# Patient Record
Sex: MALE | Race: WHITE
[De-identification: names, ages, dates, MRNs, and addresses within clinical notes are randomized per-mention and may not be internally consistent; named-entity substitution may affect disease eponyms.]

---

## 2020-10-23 ENCOUNTER — HOSPITAL ENCOUNTER (OUTPATIENT)
Age: 65
End: 2020-10-23
Payer: MEDICARE

## 2020-10-23 DIAGNOSIS — M25.561: Primary | ICD-10-CM

## 2020-10-23 PROCEDURE — 73562 X-RAY EXAM OF KNEE 3: CPT

## 2020-10-23 NOTE — DI.RAD.S_ITS
PROCEDURE:  XR KNEE RT 3V  
   
INDICATIONS:  Chronic right knee pain  
   
TECHNIQUE:  4 views of the knee were acquired.    
   
COMPARISON:  None.  
   
FINDINGS:    
   
Bones:  No fractures or dislocations.  No suspicious bony lesions.    
   
Soft tissues:  No joint effusion.  No suspicious soft tissue calcifications.    
   
IMPRESSION:  No acute abnormality of the right knee.  
   
   
Dictated by: Klaus Schulz M.D. on 10/23/2020 at 17:04       
Approved by: Klaus Schulz M.D. on 10/23/2020 at 17:06

## 2020-10-24 ENCOUNTER — HOSPITAL ENCOUNTER (OUTPATIENT)
Age: 65
End: 2020-10-24
Payer: MEDICARE

## 2020-10-24 DIAGNOSIS — N52.9: ICD-10-CM

## 2020-10-24 DIAGNOSIS — K22.70: ICD-10-CM

## 2020-10-24 DIAGNOSIS — E78.5: Primary | ICD-10-CM

## 2020-10-24 DIAGNOSIS — R73.9: ICD-10-CM

## 2020-10-24 DIAGNOSIS — Z12.5: ICD-10-CM

## 2020-10-24 LAB
ALBUMIN SERPL-MCNC: 4.1 G/DL (ref 3.5–5)
ALBUMIN/GLOB SERPL: 1.5 {RATIO} (ref 1–2.8)
ALP SERPL-CCNC: 71 U/L (ref 38–126)
ALT SERPL-CCNC: 11 IU/L (ref ?–50)
BUN SERPL-MCNC: 14 MG/DL (ref 9–20)
CALCIUM SERPL-MCNC: 9.1 MG/DL (ref 8.4–10.2)
CHLORIDE SERPL-SCNC: 105 MMOL/L (ref 98–107)
CHOLEST SERPL-MCNC: 142 MG/DL (ref 140–199)
CO2 SERPL-SCNC: 31 MMOL/L (ref 22–32)
ESTIMATED GLOMERULAR FILT RATE: > 60 ML/MIN (ref 60–?)
GLOBULIN SER CALC-MCNC: 2.8 G/DL (ref 1.7–4.1)
GLUCOSE SERPL-MCNC: 97 MG/DL (ref 80–110)
HBA1C MFR BLD: 5.6 % (ref 4–6)
HDLC SERPL-MCNC: 63 MG/DL (ref 40–60)
HEMOLYSIS: < 15 (ref 0–50)
POTASSIUM SERPL-SCNC: 4 MMOL/L (ref 3.4–5.1)
PROT SERPL-MCNC: 6.9 G/DL (ref 6.3–8.2)
SODIUM SERPL-SCNC: 139 MMOL/L (ref 137–145)
TRIGL SERPL-MCNC: 119 MG/DL (ref 35–150)

## 2020-10-24 PROCEDURE — 80061 LIPID PANEL: CPT

## 2020-10-24 PROCEDURE — 80053 COMPREHEN METABOLIC PANEL: CPT

## 2020-10-24 PROCEDURE — 83036 HEMOGLOBIN GLYCOSYLATED A1C: CPT

## 2020-10-24 PROCEDURE — 36415 COLL VENOUS BLD VENIPUNCTURE: CPT

## 2020-10-28 ENCOUNTER — HOSPITAL ENCOUNTER (OUTPATIENT)
Age: 65
End: 2020-10-28
Payer: MEDICARE

## 2020-10-28 DIAGNOSIS — S83.411A: ICD-10-CM

## 2020-10-28 DIAGNOSIS — M25.461: ICD-10-CM

## 2020-10-28 DIAGNOSIS — M25.561: Primary | ICD-10-CM

## 2020-10-28 DIAGNOSIS — S83.231A: ICD-10-CM

## 2020-10-28 PROCEDURE — 73721 MRI JNT OF LWR EXTRE W/O DYE: CPT

## 2020-10-28 NOTE — DI.MRI.S_ITS
PROCEDURE:  MR KNEE RT WO CON  
   
INDICATIONS:  Evaluate chronic right knee pain  
   
TECHNIQUE:    
Noncontrast sagittal PD fast spin echo and T2 fast spin echo with fat saturation,   
sagittal 3-D FLASH with fat saturation; coronal T1 spin echo and PD fast spin echo with   
fat saturation, and axial PD fast spin echo with fat saturation through the knee.    
   
COMPARISON:  [facility], CR, XR KNEE RT 3V, 10/23/2020, 16:41.  
   
FINDINGS:    
Image quality:  Excellent.    
   
Menisci:  There is complex tear of the posterior horn of the medial meniscus involving   
the inferior articular surface.  A horizontal tear is noted in the body of the medial   
meniscus without involvement of the articular surface.  The lateral meniscus demonstrates   
 normal morphology and internal signal.  The meniscal root ligaments appear intact.    
   
Cruciate ligaments:  There is mucoid degeneration of the anterior cruciate ligament.  The   
posterior cruciate ligament appears intact.    
   
Medial structures:  There is grade 1 sprain of the medial collateral ligament.  The   
semimembranosus tendon insertions and meniscocapsular junction appear intact.  Visualized   
portions of the pes anserinus tendons appear normal.  No abnormal bursal fluid.    
   
Lateral structures:  The lateral collateral ligament, long and short heads of the biceps   
femoris tendon appear intact.  The popliteus tendon appears normal.  Iliotibial band   
appears normal.    
   
Anterior structures:  The quadriceps and patellar tendons appear intact.  Patellar   
alignment is normal.  No femoral trochlear dysplasia or ventral trochlear prominence.  No   
edema in the infrapatellar fat pad.    
   
Bones and cartilage:  No bone marrow contusions or fractures.  The cartilage of the   
medial and lateral femorotibial compartments, as well as the patellofemoral compartment,   
appears normal in thickness.    
   
Joint space:  There is small-to-moderate knee joint effusion.  No Baker's cyst.  Normal   
appearing synovial plicae are incidentally noted.    
   
IMPRESSION:    
   
1. Complex tear of the posterior horn the medial meniscus involving the inferior   
articular surface.  There is also horizontal tear of the body of the medial meniscus   
without involving the articular surface.  
   
2. Mucoid degeneration of the anterior cruciate ligament.  
   
3. Grade 1 sprain of the medial collateral ligament.  
   
4. Small-to-moderate knee joint effusion.    
   
   
   
Dictated by: Rob Sharpe M.D. on 10/29/2020 at 8:03       
Approved by: Rob Sharpe M.D. on 10/29/2020 at 12:36

## 2020-12-15 ENCOUNTER — HOSPITAL ENCOUNTER (OUTPATIENT)
Age: 65
End: 2020-12-15
Payer: MEDICARE

## 2020-12-15 DIAGNOSIS — N28.1: Primary | ICD-10-CM

## 2020-12-15 PROCEDURE — 76770 US EXAM ABDO BACK WALL COMP: CPT

## 2020-12-15 NOTE — DI.US.S_ITS
PROCEDURE:  US RENAL COMPLETE  
   
INDICATIONS:  RIGHT RENAL CYSTS ON CATSCAN  
   
TECHNIQUE:    
Real-time scanning was performed of the kidneys and bladder, with image documentation.    
   
COMPARISON:  [facility], RG, CT  LUNG CANCER SCREENING, 11/27/2020, 15:33.  
   
FINDINGS:    
   
Kidneys:  .  Right kidney measures 16.6 cm long; left kidney measures 11.3 cm long.    
Right renal cortical thickness is not visible sonographically due to advanced cystic   
changes; left renal cortical thickness is 1.9 cm.  Renal cortical echotexture is normal.    
No hydronephrosis or nephrolithiasis.  No suspicious solid mass lesions.  There are   
multiple large right renal cysts, the largest of which measures 6.1 x 5.1 x 6.0 cm, 6.3 x   
4.5 x 4.6 cm, 5.4 x 3.4 x 5.3 cm.  Small left renal cysts are seen in the lower pole   
measuring 0.8 x 0.5 x 0.7 cm, and in the upper pole measuring 1.3 x 1.0 x 1.3 cm.   
   
Bladder:  Bladder unremarkable.  Both ureteral jets visualized.  
   
Miscellaneous:  No free pelvic fluid.    
   
IMPRESSION:  Polycystic right kidney.  
   
Few small left renal cysts.  
   
   
   
   
Dictated by: Lalito Goff M.D. on 12/15/2020 at 13:20       
Approved by: Lalito Goff M.D. on 12/15/2020 at 13:23

## 2022-02-10 ENCOUNTER — HOSPITAL ENCOUNTER (OUTPATIENT)
Age: 67
End: 2022-02-10
Payer: MEDICARE

## 2022-02-10 DIAGNOSIS — M25.511: Primary | ICD-10-CM

## 2022-02-10 DIAGNOSIS — G89.29: ICD-10-CM

## 2022-02-10 PROCEDURE — 73030 X-RAY EXAM OF SHOULDER: CPT

## 2022-02-12 ENCOUNTER — HOSPITAL ENCOUNTER (OUTPATIENT)
Age: 67
End: 2022-02-12
Payer: MEDICARE

## 2022-02-12 DIAGNOSIS — E78.5: Primary | ICD-10-CM

## 2022-02-12 DIAGNOSIS — Z12.5: ICD-10-CM

## 2022-02-12 DIAGNOSIS — R73.9: ICD-10-CM

## 2022-02-12 DIAGNOSIS — N52.9: ICD-10-CM

## 2022-02-12 DIAGNOSIS — K22.70: ICD-10-CM

## 2022-02-12 LAB
ADD MANUAL DIFF / SLIDE REVIEW: NO
ALBUMIN SERPL-MCNC: 4.1 G/DL (ref 3.5–5)
ALBUMIN/GLOB SERPL: 1.4 {RATIO} (ref 1–2.8)
ALP SERPL-CCNC: 112 U/L (ref 38–126)
ALT SERPL-CCNC: 11 IU/L (ref ?–50)
BUN SERPL-MCNC: 15 MG/DL (ref 9–20)
CALCIUM SERPL-MCNC: 9.4 MG/DL (ref 8.4–10.2)
CHLORIDE SERPL-SCNC: 106 MMOL/L (ref 98–107)
CHOLEST SERPL-MCNC: 132 MG/DL (ref 140–199)
CO2 SERPL-SCNC: 29 MMOL/L (ref 22–32)
ESTIMATED GLOMERULAR FILT RATE: > 60 ML/MIN (ref 60–?)
GLOBULIN SER CALC-MCNC: 2.9 G/DL (ref 1.7–4.1)
GLUCOSE SERPL-MCNC: 102 MG/DL (ref 80–110)
HDLC SERPL-MCNC: 49 MG/DL (ref 40–60)
HEMATOCRIT: 41.5 % (ref 41–53)
HEMOGLOBIN: 14.1 G/DL (ref 13.5–17.5)
HEMOLYSIS: < 15 (ref 0–50)
LYMPHOCYTES # SPEC AUTO: 1800 /UL (ref 1100–4500)
MCV RBC: 93.7 FL (ref 80–100)
MEAN CORPUSCULAR HEMOGLOBIN: 31.8 PG (ref 26–34)
MEAN CORPUSCULAR HGB CONC: 34 % (ref 30–36)
PLATELET COUNT: 301 X10^3/UL (ref 150–400)
POTASSIUM SERPL-SCNC: 4 MMOL/L (ref 3.4–5.1)
PROT SERPL-MCNC: 7 G/DL (ref 6.3–8.2)
SODIUM SERPL-SCNC: 139 MMOL/L (ref 137–145)
TRIGL SERPL-MCNC: 78 MG/DL (ref 35–150)

## 2022-02-12 PROCEDURE — 80053 COMPREHEN METABOLIC PANEL: CPT

## 2022-02-12 PROCEDURE — 85025 COMPLETE CBC W/AUTO DIFF WBC: CPT

## 2022-02-12 PROCEDURE — 36415 COLL VENOUS BLD VENIPUNCTURE: CPT

## 2022-02-12 PROCEDURE — 80061 LIPID PANEL: CPT

## 2022-04-27 ENCOUNTER — HOSPITAL ENCOUNTER (OUTPATIENT)
Age: 67
End: 2022-04-27
Payer: MEDICARE

## 2022-04-27 DIAGNOSIS — Z01.812: Primary | ICD-10-CM

## 2022-04-27 DIAGNOSIS — Z20.822: ICD-10-CM

## 2022-04-27 PROCEDURE — 87635 SARS-COV-2 COVID-19 AMP PRB: CPT

## 2022-04-28 ENCOUNTER — HOSPITAL ENCOUNTER (OUTPATIENT)
Age: 67
End: 2022-04-28
Payer: MEDICARE

## 2022-04-28 ENCOUNTER — HOSPITAL ENCOUNTER (OUTPATIENT)
Age: 67
Discharge: HOME | End: 2022-04-28
Payer: MEDICARE

## 2022-04-28 VITALS
TEMPERATURE: 97.4 F | DIASTOLIC BLOOD PRESSURE: 79 MMHG | HEART RATE: 61 BPM | OXYGEN SATURATION: 94 % | SYSTOLIC BLOOD PRESSURE: 126 MMHG | RESPIRATION RATE: 17 BRPM

## 2022-04-28 VITALS
RESPIRATION RATE: 15 BRPM | HEART RATE: 62 BPM | SYSTOLIC BLOOD PRESSURE: 125 MMHG | OXYGEN SATURATION: 95 % | DIASTOLIC BLOOD PRESSURE: 82 MMHG

## 2022-04-28 VITALS — BODY MASS INDEX: 28 KG/M2

## 2022-04-28 VITALS
HEART RATE: 59 BPM | OXYGEN SATURATION: 94 % | RESPIRATION RATE: 17 BRPM | SYSTOLIC BLOOD PRESSURE: 117 MMHG | DIASTOLIC BLOOD PRESSURE: 82 MMHG

## 2022-04-28 VITALS
RESPIRATION RATE: 18 BRPM | HEART RATE: 76 BPM | DIASTOLIC BLOOD PRESSURE: 77 MMHG | OXYGEN SATURATION: 93 % | SYSTOLIC BLOOD PRESSURE: 122 MMHG

## 2022-04-28 VITALS
RESPIRATION RATE: 14 BRPM | DIASTOLIC BLOOD PRESSURE: 87 MMHG | HEART RATE: 67 BPM | OXYGEN SATURATION: 96 % | TEMPERATURE: 98 F | SYSTOLIC BLOOD PRESSURE: 133 MMHG

## 2022-04-28 VITALS
OXYGEN SATURATION: 94 % | SYSTOLIC BLOOD PRESSURE: 112 MMHG | DIASTOLIC BLOOD PRESSURE: 84 MMHG | HEART RATE: 62 BPM | RESPIRATION RATE: 18 BRPM

## 2022-04-28 DIAGNOSIS — Z12.11: Primary | ICD-10-CM

## 2022-04-28 DIAGNOSIS — K64.8: ICD-10-CM

## 2022-04-28 DIAGNOSIS — Z86.010: ICD-10-CM

## 2022-04-28 DIAGNOSIS — D17.1: Primary | ICD-10-CM

## 2022-04-28 DIAGNOSIS — K57.30: ICD-10-CM

## 2022-04-28 PROCEDURE — 76705 ECHO EXAM OF ABDOMEN: CPT

## 2022-04-28 PROCEDURE — 0DJD8ZZ INSPECTION OF LOWER INTESTINAL TRACT, VIA NATURAL OR ARTIFICIAL OPENING ENDOSCOPIC: ICD-10-PCS | Performed by: SURGERY

## 2022-04-28 PROCEDURE — 45380 COLONOSCOPY AND BIOPSY: CPT

## 2022-04-28 PROCEDURE — 99152 MOD SED SAME PHYS/QHP 5/>YRS: CPT

## 2023-05-16 ENCOUNTER — HOSPITAL ENCOUNTER (OUTPATIENT)
Age: 68
End: 2023-05-16
Payer: MEDICARE

## 2023-05-16 DIAGNOSIS — Z87.891: ICD-10-CM

## 2023-05-16 DIAGNOSIS — N52.9: ICD-10-CM

## 2023-05-16 DIAGNOSIS — R73.9: ICD-10-CM

## 2023-05-16 DIAGNOSIS — E78.5: Primary | ICD-10-CM

## 2023-05-16 DIAGNOSIS — Z12.5: ICD-10-CM

## 2023-05-16 DIAGNOSIS — K22.70: ICD-10-CM

## 2023-05-16 LAB
ADD MANUAL DIFF / SLIDE REVIEW: NO
ALBUMIN SERPL-MCNC: 4.3 G/DL (ref 3.5–5)
ALBUMIN/GLOB SERPL: 1.4 {RATIO} (ref 1–2.8)
ALP SERPL-CCNC: 97 U/L (ref 38–126)
ALT SERPL-CCNC: 16 IU/L (ref ?–50)
BUN SERPL-MCNC: 17 MG/DL (ref 9–20)
CALCIUM SERPL-MCNC: 9.2 MG/DL (ref 8.4–10.2)
CHLORIDE SERPL-SCNC: 104 MMOL/L (ref 98–107)
CHOLEST SERPL-MCNC: 205 MG/DL (ref 140–199)
CO2 SERPL-SCNC: 30 MMOL/L (ref 22–32)
ESTIMATED GLOMERULAR FILT RATE: > 60 ML/MIN (ref 60–?)
GLOBULIN SER CALC-MCNC: 3 G/DL (ref 1.7–4.1)
GLUCOSE SERPL-MCNC: 89 MG/DL (ref 80–110)
HDLC SERPL-MCNC: 59 MG/DL (ref 40–60)
HEMATOCRIT: 42.1 % (ref 41–53)
HEMOGLOBIN: 14.4 G/DL (ref 13.5–17.5)
HEMOLYSIS: < 15 (ref 0–50)
LYMPHOCYTES # SPEC AUTO: 2000 /UL (ref 1100–4500)
MCV RBC: 94.5 FL (ref 80–100)
MEAN CORPUSCULAR HEMOGLOBIN: 32.2 PG (ref 26–34)
MEAN CORPUSCULAR HGB CONC: 34.1 % (ref 30–36)
MICROALBUMI CREATININ RATIO UR: 57 UG/MG CR (ref ?–30)
PLATELET COUNT: 270 X10^3/UL (ref 150–400)
POTASSIUM SERPL-SCNC: 3.8 MMOL/L (ref 3.4–5.1)
PROT SERPL-MCNC: 7.3 G/DL (ref 6.3–8.2)
SODIUM SERPL-SCNC: 140 MMOL/L (ref 137–145)
TRIGL SERPL-MCNC: 312 MG/DL (ref 35–150)
TSH W/ REFLEX TO FT4: 2.05 UIU/ML (ref 0.47–4.68)

## 2023-05-16 PROCEDURE — 87522 HEPATITIS C REVRS TRNSCRPJ: CPT

## 2023-05-16 PROCEDURE — 80053 COMPREHEN METABOLIC PANEL: CPT

## 2023-05-16 PROCEDURE — 80061 LIPID PANEL: CPT

## 2023-05-16 PROCEDURE — 36415 COLL VENOUS BLD VENIPUNCTURE: CPT

## 2023-05-16 PROCEDURE — 86803 HEPATITIS C AB TEST: CPT

## 2023-05-16 PROCEDURE — 82570 ASSAY OF URINE CREATININE: CPT

## 2023-05-16 PROCEDURE — 84443 ASSAY THYROID STIM HORMONE: CPT

## 2023-05-16 PROCEDURE — 82043 UR ALBUMIN QUANTITATIVE: CPT

## 2023-05-16 PROCEDURE — 85025 COMPLETE CBC W/AUTO DIFF WBC: CPT

## 2023-05-17 LAB — HCV AB SERPL QL IA: REACTIVE S/C

## 2023-05-20 ENCOUNTER — HOSPITAL ENCOUNTER (OUTPATIENT)
Age: 68
End: 2023-05-20
Payer: MEDICARE

## 2023-05-20 DIAGNOSIS — I25.10: Primary | ICD-10-CM

## 2023-05-20 DIAGNOSIS — Z87.891: ICD-10-CM

## 2023-05-20 LAB — HCV RNA SERPL NAA+PROBE-LOG IU: (no result) {LOG_IU}/ML

## 2023-05-20 PROCEDURE — 71250 CT THORAX DX C-: CPT

## 2023-05-24 ENCOUNTER — HOSPITAL ENCOUNTER (OUTPATIENT)
Age: 68
End: 2023-05-24
Payer: MEDICARE

## 2023-05-24 DIAGNOSIS — R22.32: ICD-10-CM

## 2023-05-24 DIAGNOSIS — M19.90: Primary | ICD-10-CM

## 2023-05-24 PROCEDURE — 73130 X-RAY EXAM OF HAND: CPT

## 2023-05-24 PROCEDURE — 99214 OFFICE O/P EST MOD 30 MIN: CPT

## 2023-06-02 ENCOUNTER — HOSPITAL ENCOUNTER (OUTPATIENT)
Age: 68
End: 2023-06-02
Payer: MEDICARE

## 2023-06-02 DIAGNOSIS — R22.32: Primary | ICD-10-CM

## 2023-06-02 PROCEDURE — 76882 US LMTD JT/FCL EVL NVASC XTR: CPT

## 2023-06-17 ENCOUNTER — HOSPITAL ENCOUNTER (OUTPATIENT)
Age: 68
End: 2023-06-17
Payer: MEDICARE

## 2023-06-17 DIAGNOSIS — N28.1: ICD-10-CM

## 2023-06-17 DIAGNOSIS — R22.32: Primary | ICD-10-CM

## 2023-06-17 DIAGNOSIS — Z90.49: ICD-10-CM

## 2023-06-17 DIAGNOSIS — J43.8: ICD-10-CM

## 2023-06-17 PROCEDURE — 71260 CT THORAX DX C+: CPT

## 2023-06-20 ENCOUNTER — HOSPITAL ENCOUNTER (OUTPATIENT)
Age: 68
Discharge: HOME | End: 2023-06-20
Payer: MEDICARE

## 2023-06-20 VITALS
DIASTOLIC BLOOD PRESSURE: 81 MMHG | SYSTOLIC BLOOD PRESSURE: 149 MMHG | OXYGEN SATURATION: 94 % | TEMPERATURE: 97.7 F | RESPIRATION RATE: 18 BRPM | HEART RATE: 67 BPM

## 2023-06-20 VITALS — BODY MASS INDEX: 28 KG/M2

## 2023-06-20 VITALS
DIASTOLIC BLOOD PRESSURE: 73 MMHG | HEART RATE: 68 BPM | SYSTOLIC BLOOD PRESSURE: 157 MMHG | RESPIRATION RATE: 14 BRPM | OXYGEN SATURATION: 95 %

## 2023-06-20 VITALS
OXYGEN SATURATION: 97 % | DIASTOLIC BLOOD PRESSURE: 86 MMHG | RESPIRATION RATE: 20 BRPM | HEART RATE: 60 BPM | SYSTOLIC BLOOD PRESSURE: 147 MMHG | TEMPERATURE: 97.3 F

## 2023-06-20 VITALS
TEMPERATURE: 97.3 F | SYSTOLIC BLOOD PRESSURE: 160 MMHG | DIASTOLIC BLOOD PRESSURE: 86 MMHG | RESPIRATION RATE: 16 BRPM | OXYGEN SATURATION: 95 % | HEART RATE: 67 BPM

## 2023-06-20 VITALS
DIASTOLIC BLOOD PRESSURE: 88 MMHG | OXYGEN SATURATION: 99 % | TEMPERATURE: 97.88 F | SYSTOLIC BLOOD PRESSURE: 154 MMHG | HEART RATE: 66 BPM | RESPIRATION RATE: 16 BRPM

## 2023-06-20 DIAGNOSIS — D17.1: Primary | ICD-10-CM

## 2023-06-20 DIAGNOSIS — L91.8: ICD-10-CM

## 2023-06-20 PROCEDURE — 23071 EXC SHOULDER LES SC 3 CM/>: CPT

## 2023-06-20 PROCEDURE — 11200 RMVL SKIN TAGS UP TO&INC 15: CPT

## 2023-07-19 ENCOUNTER — HOSPITAL ENCOUNTER (OUTPATIENT)
Age: 68
Discharge: HOME | End: 2023-07-19
Payer: MEDICARE

## 2023-07-19 VITALS
HEART RATE: 56 BPM | DIASTOLIC BLOOD PRESSURE: 90 MMHG | OXYGEN SATURATION: 97 % | TEMPERATURE: 98.06 F | RESPIRATION RATE: 18 BRPM | SYSTOLIC BLOOD PRESSURE: 138 MMHG

## 2023-07-19 VITALS
OXYGEN SATURATION: 96 % | SYSTOLIC BLOOD PRESSURE: 135 MMHG | HEART RATE: 53 BPM | DIASTOLIC BLOOD PRESSURE: 89 MMHG | RESPIRATION RATE: 20 BRPM | TEMPERATURE: 98.2 F

## 2023-07-19 VITALS
DIASTOLIC BLOOD PRESSURE: 99 MMHG | TEMPERATURE: 97.1 F | RESPIRATION RATE: 17 BRPM | SYSTOLIC BLOOD PRESSURE: 164 MMHG | OXYGEN SATURATION: 98 % | HEART RATE: 55 BPM

## 2023-07-19 VITALS
HEART RATE: 50 BPM | OXYGEN SATURATION: 97 % | SYSTOLIC BLOOD PRESSURE: 148 MMHG | DIASTOLIC BLOOD PRESSURE: 94 MMHG | RESPIRATION RATE: 20 BRPM

## 2023-07-19 VITALS
OXYGEN SATURATION: 91 % | HEART RATE: 53 BPM | RESPIRATION RATE: 27 BRPM | DIASTOLIC BLOOD PRESSURE: 83 MMHG | SYSTOLIC BLOOD PRESSURE: 138 MMHG

## 2023-07-19 VITALS — BODY MASS INDEX: 28.5 KG/M2

## 2023-07-19 DIAGNOSIS — Z87.891: ICD-10-CM

## 2023-07-19 DIAGNOSIS — K22.70: Primary | ICD-10-CM

## 2023-07-19 DIAGNOSIS — Z87.19: ICD-10-CM

## 2023-07-19 PROCEDURE — 0DJ08ZZ INSPECTION OF UPPER INTESTINAL TRACT, VIA NATURAL OR ARTIFICIAL OPENING ENDOSCOPIC: ICD-10-PCS | Performed by: INTERNAL MEDICINE

## 2023-07-19 PROCEDURE — 43239 EGD BIOPSY SINGLE/MULTIPLE: CPT

## 2023-08-30 ENCOUNTER — HOSPITAL ENCOUNTER (OUTPATIENT)
Age: 68
End: 2023-08-30
Payer: MEDICARE

## 2023-08-30 DIAGNOSIS — R22.32: Primary | ICD-10-CM

## 2023-08-30 DIAGNOSIS — R20.2: ICD-10-CM

## 2023-08-30 PROCEDURE — A9579 GAD-BASE MR CONTRAST NOS,1ML: HCPCS

## 2023-08-30 PROCEDURE — 73220 MRI UPPR EXTREMITY W/O&W/DYE: CPT

## 2024-08-27 ENCOUNTER — HOSPITAL ENCOUNTER (OUTPATIENT)
Dept: HOSPITAL 73 - LAB | Age: 69
End: 2024-08-27
Payer: MEDICARE

## 2024-08-27 DIAGNOSIS — Z87.891: ICD-10-CM

## 2024-08-27 DIAGNOSIS — R73.9: ICD-10-CM

## 2024-08-27 DIAGNOSIS — N52.9: ICD-10-CM

## 2024-08-27 DIAGNOSIS — K22.70: ICD-10-CM

## 2024-08-27 DIAGNOSIS — E78.5: Primary | ICD-10-CM

## 2024-08-27 DIAGNOSIS — Z12.5: ICD-10-CM

## 2024-08-27 DIAGNOSIS — K21.9: ICD-10-CM

## 2024-08-27 LAB
ADD MANUAL DIFF / SLIDE REVIEW: NO
ALBUMIN SERPL-MCNC: 4.5 G/DL (ref 3.5–5)
ALBUMIN/GLOB SERPL: 1.5 {RATIO} (ref 1–2.8)
ALP SERPL-CCNC: 101 U/L (ref 38–126)
ALT SERPL-CCNC: 11 IU/L (ref ?–50)
BUN SERPL-MCNC: 15 MG/DL (ref 9–20)
CALCIUM SERPL-MCNC: 9.5 MG/DL (ref 8.4–10.2)
CHLORIDE SERPL-SCNC: 102 MMOL/L (ref 98–107)
CHOLEST SERPL-MCNC: 189 MG/DL (ref 140–199)
CO2 SERPL-SCNC: 27 MMOL/L (ref 22–32)
ESTIMATED GLOMERULAR FILT RATE: > 60 ML/MIN (ref 60–?)
GLOBULIN SER CALC-MCNC: 3.1 G/DL (ref 1.7–4.1)
GLUCOSE SERPL-MCNC: 93 MG/DL (ref 80–110)
HDLC SERPL-MCNC: 73 MG/DL (ref 40–60)
HEMATOCRIT: 44.6 % (ref 41–53)
HEMOGLOBIN: 15.2 G/DL (ref 13.5–17.5)
HEMOLYSIS: < 15 (ref 0–50)
LYMPHOCYTES # SPEC AUTO: 1900 /UL (ref 1100–4500)
MCV RBC: 95.6 FL (ref 80–100)
MEAN CORPUSCULAR HEMOGLOBIN: 32.6 PG (ref 26–34)
MEAN CORPUSCULAR HGB CONC: 34 % (ref 30–36)
MICROALBUMI CREATININ RATIO UR: 17 UG/MG CR (ref ?–30)
PLATELET COUNT: 276 X10^3/UL (ref 150–400)
POTASSIUM SERPL-SCNC: 4.4 MMOL/L (ref 3.4–5.1)
PROT SERPL-MCNC: 7.6 G/DL (ref 6.3–8.2)
SODIUM SERPL-SCNC: 137 MMOL/L (ref 137–145)
TRIGL SERPL-MCNC: 120 MG/DL (ref 35–150)

## 2024-08-27 PROCEDURE — 80053 COMPREHEN METABOLIC PANEL: CPT

## 2024-08-27 PROCEDURE — 82570 ASSAY OF URINE CREATININE: CPT

## 2024-08-27 PROCEDURE — 82043 UR ALBUMIN QUANTITATIVE: CPT

## 2024-08-27 PROCEDURE — 36415 COLL VENOUS BLD VENIPUNCTURE: CPT

## 2024-08-27 PROCEDURE — 80061 LIPID PANEL: CPT

## 2024-08-27 PROCEDURE — 85025 COMPLETE CBC W/AUTO DIFF WBC: CPT

## 2025-02-04 ENCOUNTER — HOSPITAL ENCOUNTER (OUTPATIENT)
Age: 70
End: 2025-02-04
Payer: MEDICARE

## 2025-02-04 DIAGNOSIS — R35.0: Primary | ICD-10-CM

## 2025-02-04 LAB
APPEARANCE UR: CLEAR
BILIRUBIN URINE UA: NEGATIVE
COLOR UR: YELLOW
GLUCOSE URINE UA: NEGATIVE G/DL
HGB UR QL: NEGATIVE
KETONES URINE UA: NEGATIVE
LEUKOCYTE ESTERASE URINE UA: NEGATIVE
NITRITE URINE UA: NEGATIVE
PH UR: 6 [PH] (ref 4.5–8)
PROTEIN URINE UA: NEGATIVE
SP GR UR: 1.02 (ref 1–1.03)
UROBILINOGEN UR QL: 0.2 E.U./DL

## 2025-02-04 PROCEDURE — 81001 URINALYSIS AUTO W/SCOPE: CPT

## 2025-03-25 ENCOUNTER — HOSPITAL ENCOUNTER (OUTPATIENT)
Age: 70
End: 2025-03-25
Payer: MEDICARE

## 2025-03-25 DIAGNOSIS — R39.15: ICD-10-CM

## 2025-03-25 DIAGNOSIS — M19.042: ICD-10-CM

## 2025-03-25 DIAGNOSIS — M79.642: ICD-10-CM

## 2025-03-25 DIAGNOSIS — R35.0: ICD-10-CM

## 2025-03-25 DIAGNOSIS — M79.89: Primary | ICD-10-CM

## 2025-03-25 DIAGNOSIS — E78.5: ICD-10-CM

## 2025-03-25 LAB
ALBUMIN SERPL-MCNC: 4.4 G/DL (ref 3.5–5)
ALBUMIN/GLOB SERPL: 1.6 {RATIO} (ref 1–2.8)
ALP SERPL-CCNC: 82 U/L (ref 38–126)
ALT SERPL-CCNC: 16 IU/L (ref ?–50)
BUN SERPL-MCNC: 21 MG/DL (ref 9–20)
CALCIUM SERPL-MCNC: 9.2 MG/DL (ref 8.4–10.2)
CHLORIDE SERPL-SCNC: 105 MMOL/L (ref 98–107)
CO2 SERPL-SCNC: 26 MMOL/L (ref 22–32)
ESTIMATED GLOMERULAR FILT RATE: > 60 ML/MIN (ref 60–?)
GLOBULIN SER CALC-MCNC: 2.8 G/DL (ref 1.7–4.1)
GLUCOSE SERPL-MCNC: 112 MG/DL (ref 80–110)
HEMOLYSIS: < 15 (ref 0–50)
POTASSIUM SERPL-SCNC: 3.8 MMOL/L (ref 3.4–5.1)
PROT SERPL-MCNC: 7.2 G/DL (ref 6.3–8.2)
PSA SERPL-MCNC: 0.33 NG/ML (ref 0.1–4)
SODIUM SERPL-SCNC: 139 MMOL/L (ref 137–145)
URATE SERPL-MCNC: 5.5 MG/DL (ref 3.5–8.5)

## 2025-03-25 PROCEDURE — 84153 ASSAY OF PSA TOTAL: CPT

## 2025-03-25 PROCEDURE — 86430 RHEUMATOID FACTOR TEST QUAL: CPT

## 2025-03-25 PROCEDURE — 86140 C-REACTIVE PROTEIN: CPT

## 2025-03-25 PROCEDURE — 36415 COLL VENOUS BLD VENIPUNCTURE: CPT

## 2025-03-25 PROCEDURE — 86200 CCP ANTIBODY: CPT

## 2025-03-25 PROCEDURE — 73130 X-RAY EXAM OF HAND: CPT

## 2025-03-25 PROCEDURE — 80053 COMPREHEN METABOLIC PANEL: CPT

## 2025-03-25 PROCEDURE — 85651 RBC SED RATE NONAUTOMATED: CPT

## 2025-03-25 PROCEDURE — 84550 ASSAY OF BLOOD/URIC ACID: CPT

## 2025-03-25 PROCEDURE — 81002 URINALYSIS NONAUTO W/O SCOPE: CPT

## 2025-03-25 NOTE — DI.RAD.S_ITS
PROCEDURE:  XR HAND LT MIN 3V 
  
INDICATIONS:  left hand pain and swelling 
  
TECHNIQUE:  3 views of the hand(s) acquired.   
  
COMPARISON:  [facility], [facility], XR HAND LT MIN 3V, 5/24/2023, 9:32. 
  
FINDINGS:   
  
Bones:  No acute fractures or dislocations.  Chronic nonunion ulnar styloid tip fracture  
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  Carpal bones are normally aligned.  Mild polyarticular  
joint space narrowing with periarticular osteophyte formation similar prior examination.   
Juxta-articular lucency involves the 2nd and 3rd MCP joints as well as the 2nd PIE P and  
5th DIP joints.  No suspicious bony lesions.   
  
Soft tissues:  No suspicious soft tissue calcifications.  Tiny 1mm punctate soft tissue  
high attenuation focus again seen adjacent to the distal ulna.  Dorsal soft tissue  
swelling at the level of the distal radius and ulna. 
  
IMPRESSION:   
  
1. Polyarticular joint degeneration and scattered juxta-articular lucencies which are  
technically indeterminate suggestive of subchondral cystic change versus bony erosions.   
Inflammatory arthropathy cannot be excluded. 
  
2. Probable metallic soft tissue foreign body adjacent to the distal ulna.   
  
3. Dorsal soft tissue swelling.   
  
4.  If symptoms persist or worsen, or there is high clinical suspicion of left hand  
abnormality, MRI could be performed. 
  
  
Dictated by: Jay Jay KERR Interpreted: Juaquin Dhaliwal MD on 3/25/2025 at 16:08    
Transcribed by: IKE on 3/25/2025 at 16:13     
Approved by: Juaquin Dhaliwal M.D. on 3/27/2025 at 8:56